# Patient Record
Sex: MALE | Race: WHITE | NOT HISPANIC OR LATINO | Employment: FULL TIME | URBAN - METROPOLITAN AREA
[De-identification: names, ages, dates, MRNs, and addresses within clinical notes are randomized per-mention and may not be internally consistent; named-entity substitution may affect disease eponyms.]

---

## 2018-12-09 ENCOUNTER — OFFICE VISIT (OUTPATIENT)
Dept: URGENT CARE | Facility: PHYSICIAN GROUP | Age: 28
End: 2018-12-09
Payer: COMMERCIAL

## 2018-12-09 VITALS
TEMPERATURE: 97.7 F | WEIGHT: 168.4 LBS | BODY MASS INDEX: 24.86 KG/M2 | OXYGEN SATURATION: 100 % | HEART RATE: 61 BPM | SYSTOLIC BLOOD PRESSURE: 116 MMHG | DIASTOLIC BLOOD PRESSURE: 74 MMHG | RESPIRATION RATE: 16 BRPM

## 2018-12-09 DIAGNOSIS — J01.00 ACUTE NON-RECURRENT MAXILLARY SINUSITIS: ICD-10-CM

## 2018-12-09 PROCEDURE — 99214 OFFICE O/P EST MOD 30 MIN: CPT | Performed by: PHYSICIAN ASSISTANT

## 2018-12-09 RX ORDER — AMOXICILLIN AND CLAVULANATE POTASSIUM 875; 125 MG/1; MG/1
1 TABLET, FILM COATED ORAL 2 TIMES DAILY
Qty: 14 TAB | Refills: 0 | Status: SHIPPED | OUTPATIENT
Start: 2018-12-09 | End: 2018-12-16

## 2018-12-09 RX ORDER — FLUTICASONE PROPIONATE 50 MCG
1 SPRAY, SUSPENSION (ML) NASAL DAILY
Qty: 16 G | Refills: 0 | Status: SHIPPED | OUTPATIENT
Start: 2018-12-09 | End: 2020-03-26

## 2018-12-09 ASSESSMENT — ENCOUNTER SYMPTOMS
SORE THROAT: 1
SINUS PAIN: 1
CARDIOVASCULAR NEGATIVE: 1
SHORTNESS OF BREATH: 0
COUGH: 0
GASTROINTESTINAL NEGATIVE: 1
SINUS PRESSURE: 1
MUSCULOSKELETAL NEGATIVE: 1
CONSTITUTIONAL NEGATIVE: 1
HEADACHES: 1
SWOLLEN GLANDS: 1
WHEEZING: 0

## 2018-12-09 NOTE — PROGRESS NOTES
Subjective:      Jocelynn Alonzo is a 28 y.o. male who presents with Sinus Problem (right sinus pain)            Sinus Problem   This is a new problem. The current episode started in the past 7 days. The problem is unchanged. There has been no fever. The fever has been present for less than 1 day. His pain is at a severity of 5/10. The pain is mild. Associated symptoms include congestion, headaches, sinus pressure, a sore throat and swollen glands. Pertinent negatives include no coughing, ear pain or shortness of breath. Past treatments include oral decongestants. The treatment provided mild relief.       PMH:  has no past medical history on file.  MEDS: No current outpatient prescriptions on file.  ALLERGIES: No Known Allergies  SURGHX: No past surgical history on file.  SOCHX:  reports that he has never smoked. He has never used smokeless tobacco.  FH: family history is not on file.      Review of Systems   Constitutional: Negative.    HENT: Positive for congestion, sinus pain, sinus pressure and sore throat. Negative for ear pain.    Respiratory: Negative for cough, shortness of breath and wheezing.    Cardiovascular: Negative.    Gastrointestinal: Negative.    Musculoskeletal: Negative.    Neurological: Positive for headaches.       Medications, Allergies, and current problem list reviewed today in Epic     Objective:     /74 (BP Location: Right arm, Patient Position: Sitting, BP Cuff Size: Adult)   Pulse 61   Temp 36.5 °C (97.7 °F)   Resp 16   Wt 76.4 kg (168 lb 6.4 oz)   SpO2 100%   BMI 24.86 kg/m²      Physical Exam   Constitutional: He is oriented to person, place, and time. He appears well-developed and well-nourished. No distress.   HENT:   Head: Normocephalic and atraumatic.   Right Ear: Hearing, tympanic membrane and external ear normal.   Left Ear: Hearing, tympanic membrane and external ear normal.   Nose: Right sinus exhibits maxillary sinus tenderness. Left sinus exhibits maxillary  sinus tenderness.   Mouth/Throat: Normal dentition. No dental caries. Posterior oropharyngeal erythema present. No oropharyngeal exudate.   Eyes: Conjunctivae are normal. Right eye exhibits no discharge. Left eye exhibits no discharge.   Neck: Normal range of motion. Neck supple.   Cardiovascular: Normal rate, regular rhythm and normal heart sounds.    Pulmonary/Chest: Effort normal and breath sounds normal. No respiratory distress. He has no wheezes. He has no rales.   Lymphadenopathy:        Head (right side): Submandibular adenopathy present.        Head (left side): Submandibular adenopathy present.     He has no cervical adenopathy.        Right cervical: No superficial cervical adenopathy present.       Left cervical: No superficial cervical adenopathy present.   Neurological: He is alert and oriented to person, place, and time.   Skin: Skin is warm and dry. He is not diaphoretic. No cyanosis. Nails show no clubbing.   Psychiatric: He has a normal mood and affect. His behavior is normal. Judgment and thought content normal.   Nursing note and vitals reviewed.              Assessment/Plan:     1. Acute non-recurrent maxillary sinusitis  amoxicillin-clavulanate (AUGMENTIN) 875-125 MG Tab    fluticasone (FLONASE) 50 MCG/ACT nasal spray     Take full course of antibiotic  Tylenol and Motrin for fever and pain  OTC meds as discussed including oral decongestant if tolerated  Increase fluids and rest  Nasal spray, nasal wash, Andreea pot    Return to clinic or go to ED if symptoms worsen or persist. Indications for ED discussed at length. Patient voices understanding. Follow-up with your primary care provider in 3-5 days. Red flags discussed. All side effects of medication discussed including allergic response, GI upset, tendon injury, etc.    Please note that this dictation was created using voice recognition software. I have made every reasonable attempt to correct obvious errors, but I expect that there are errors  of grammar and possibly content that I did not discover before finalizing the note.

## 2020-03-26 ENCOUNTER — OFFICE VISIT (OUTPATIENT)
Dept: URGENT CARE | Facility: PHYSICIAN GROUP | Age: 30
End: 2020-03-26
Payer: COMMERCIAL

## 2020-03-26 ENCOUNTER — HOSPITAL ENCOUNTER (EMERGENCY)
Facility: MEDICAL CENTER | Age: 30
End: 2020-03-26
Attending: EMERGENCY MEDICINE
Payer: COMMERCIAL

## 2020-03-26 VITALS
DIASTOLIC BLOOD PRESSURE: 52 MMHG | TEMPERATURE: 98.9 F | BODY MASS INDEX: 24.73 KG/M2 | HEART RATE: 44 BPM | SYSTOLIC BLOOD PRESSURE: 110 MMHG | HEIGHT: 69 IN | OXYGEN SATURATION: 100 % | RESPIRATION RATE: 16 BRPM | WEIGHT: 167 LBS

## 2020-03-26 VITALS
OXYGEN SATURATION: 95 % | DIASTOLIC BLOOD PRESSURE: 61 MMHG | SYSTOLIC BLOOD PRESSURE: 121 MMHG | TEMPERATURE: 97.2 F | HEART RATE: 65 BPM | HEIGHT: 71 IN | BODY MASS INDEX: 25.06 KG/M2 | WEIGHT: 179.01 LBS | RESPIRATION RATE: 18 BRPM

## 2020-03-26 DIAGNOSIS — S61.411A LACERATION OF RIGHT HAND WITHOUT FOREIGN BODY, INITIAL ENCOUNTER: ICD-10-CM

## 2020-03-26 DIAGNOSIS — S66.822A EXTENSOR TENDON LACERATION, HAND, OPEN WOUND, LEFT, INITIAL ENCOUNTER: ICD-10-CM

## 2020-03-26 DIAGNOSIS — S61.402A EXTENSOR TENDON LACERATION, HAND, OPEN WOUND, LEFT, INITIAL ENCOUNTER: ICD-10-CM

## 2020-03-26 PROCEDURE — 303747 HCHG EXTRA SUTURE

## 2020-03-26 PROCEDURE — 700101 HCHG RX REV CODE 250: Performed by: EMERGENCY MEDICINE

## 2020-03-26 PROCEDURE — 99284 EMERGENCY DEPT VISIT MOD MDM: CPT

## 2020-03-26 PROCEDURE — 302874 HCHG BANDAGE ACE 2 OR 3""

## 2020-03-26 PROCEDURE — 304999 HCHG REPAIR-SIMPLE/INTERMED LEVEL 1

## 2020-03-26 RX ORDER — LIDOCAINE HYDROCHLORIDE 10 MG/ML
20 INJECTION, SOLUTION INFILTRATION; PERINEURAL ONCE
Status: COMPLETED | OUTPATIENT
Start: 2020-03-26 | End: 2020-03-26

## 2020-03-26 RX ADMIN — LIDOCAINE HYDROCHLORIDE 20 ML: 10 INJECTION, SOLUTION INFILTRATION; PERINEURAL at 17:00

## 2020-03-26 SDOH — HEALTH STABILITY: MENTAL HEALTH: HOW OFTEN DO YOU HAVE A DRINK CONTAINING ALCOHOL?: 2-4 TIMES A MONTH

## 2020-03-26 NOTE — ED PROVIDER NOTES
"ED Provider Note  CHIEF COMPLAINT  Laceration    HPI  Jocelynn Alonzo is a 29 y.o. male who presents with complaint of a laceration on the dorsum of his right hand which occurred just prior to arrival.The patient sustained this injury by cutting it on a vent he was applying in his kitchen. Tetanus vaccination status reviewed and up-to-date.  He was initially seen in urgent care but sent here for further evaluation.  He denies any numbness to his finger.  He denies any difficulty moving his finger.  He is not on anticoagulation.  Denies a history of diabetes.  Patient is right-handed and works as a .    REVIEW OF SYSTEMS  See HPI for further details. All other systems are negative.     PAST MEDICAL HISTORY   Denies    SOCIAL HISTORY  Social History     Tobacco Use   • Smoking status: Never Smoker   • Smokeless tobacco: Never Used   Substance and Sexual Activity   • Alcohol use: Yes     Frequency: 2-4 times a month   • Drug use: Not Currently   • Sexual activity: Not on file       SURGICAL HISTORY   has a past surgical history that includes tonsillectomy and meniscectomy.    CURRENT MEDICATIONS  Reviewed.  See Encounter Summary.      ALLERGIES  No Known Allergies    PHYSICAL EXAM  VITAL SIGNS: /73   Pulse (!) 58   Temp 36.2 °C (97.2 °F) (Temporal)   Resp 16   Ht 1.803 m (5' 11\")   Wt 81.2 kg (179 lb 0.2 oz)   SpO2 99%   BMI 24.97 kg/m²   Constitutional: Alert in no apparent distress.  HENT: Normocephalic, Atraumatic, Bilateral external ears normal. Nose normal.   Eyes: Pupils are equal and reactive. Conjunctiva normal, non-icteric.   Thorax & Lungs: Easy unlabored respirations  Abdomen:  No gross signs of peritonitis no pain with movement   Skin: On the dorsum of the right hand just proximal to the fifth MCP joint there is a 3 cm laceration.  There is minimal bleeding.  There is evidence of an extensor tendon laceration.  No evidence of foreign body.  Extremities: Good cap refill of the " fifth digit, intact sensation to light touch, normal extension of the finger.  Neurologic: Alert, Grossly non-focal.   Psychiatric: Affect and Mood normal      COURSE & MEDICAL DECISION MAKING  Pertinent Labs & Imaging studies reviewed. (See chart for details)    Patient presents with a extensor tendon laceration of the fifth digit.  Patient is able to extend his fifth digit but visualization shows a laceration.  Discussed the case with Dr. Davalos who recommends the patient follow-up with the hand team at [facility].  The wound was cleaned and loosely approximated.  He will be placed in a hand splint to decrease movement as I suspect this is a partial laceration.  Patient understands his follow-up plan.    Laceration Repair Procedure Note    Indication: Laceration    Procedure: The patient was placed in the appropriate position and anesthesia around the laceration was obtained by infiltration using 1% Lidocaine without epinephrine. The area was then cleansed with betadine and normal saline and draped in a sterile fashion.  The laceration was viewed in a bloodless field with full range of motion and no tendon laceration or foreign bodies were visualized. The laceration was closed with 4-0 Ethilon using interrupted sutures. There were no additional lacerations requiring repair. The wound area was then dressed with a bandage.      Total repaired wound length: 3 cm.     Other Items: Suture count: 3    The patient tolerated the procedure well.    Complications: None      Patient understands to call the specialist tomorrow.  Do not feel the patient needs antibiotics at this time as the fluid was clean having never been used before.  The patient needs to return immediately if there is any redness pus or drainage or signs of infection otherwise they should follow the wound care information sheet     FINAL IMPRESSION  1. Laceration of right hand without foreign body, initial encounter     2. Extensor tendon laceration,  hand, open wound, left, initial encounter          The patient was discharged home with an information sheet on laceration care and told to return immediately for any signs or symptoms listed, but specifically if any redness, drainage, pus or wound opening.  The follow-up plan is documented in EPIC and provided in writing to the patient.    Electronically signed by: Debra Travis M.D., 3/26/2020 4:53 PM

## 2020-03-26 NOTE — ED TRIAGE NOTES
Jocelynn Osmar [facility]  Chief Complaint   Patient presents with   • Hand Laceration     right   • Sent from Urgent Care     Pt ambulatory to triage with above complaint. VSS, no acute distress.   Pt states cut hand on piece of sheet metal,  CMS intact,  Bleeding controlled with pressure bandage.  UC sent pt over due to depth of laceration. Tetanus up to date  Pt returned to lobby, educated on triage process, and to inform staff of any changes or concerns.

## 2020-03-26 NOTE — DISCHARGE INSTRUCTIONS
You have evidence of a laceration to your extensor tendon.  You need to call tomorrow to get an appointment with the hand team at the [place] [facility].  Your laceration was only loosely closed as they will have to go back in to repair the tendon.  Wear the splint until seen by the specialist.  Any worsening pain, fevers, new or different symptoms or concerns return.

## 2020-10-05 ENCOUNTER — OFFICE VISIT (OUTPATIENT)
Dept: URGENT CARE | Facility: PHYSICIAN GROUP | Age: 30
End: 2020-10-05
Payer: COMMERCIAL

## 2020-10-05 VITALS
BODY MASS INDEX: 24.88 KG/M2 | DIASTOLIC BLOOD PRESSURE: 70 MMHG | OXYGEN SATURATION: 99 % | SYSTOLIC BLOOD PRESSURE: 106 MMHG | HEIGHT: 69 IN | TEMPERATURE: 98.7 F | HEART RATE: 68 BPM | WEIGHT: 168 LBS | RESPIRATION RATE: 16 BRPM

## 2020-10-05 DIAGNOSIS — J01.01 ACUTE RECURRENT MAXILLARY SINUSITIS: ICD-10-CM

## 2020-10-05 PROCEDURE — 99214 OFFICE O/P EST MOD 30 MIN: CPT | Performed by: STUDENT IN AN ORGANIZED HEALTH CARE EDUCATION/TRAINING PROGRAM

## 2020-10-05 RX ORDER — AMOXICILLIN AND CLAVULANATE POTASSIUM 875; 125 MG/1; MG/1
1 TABLET, FILM COATED ORAL 2 TIMES DAILY
Qty: 20 TAB | Refills: 0 | Status: SHIPPED | OUTPATIENT
Start: 2020-10-05 | End: 2023-01-10

## 2020-10-05 NOTE — PROGRESS NOTES
"Subjective:   Cc:        HPI:  Jocelynn Alonzo is a 30 y.o. male who presents with a chief complaint of left maxillary sinus pain for approximately 1 week.  The patient reports his symptoms have gotten progressively worse over last 24 hours.  He was seen at urgent care approximately 2 years ago with similar symptoms and treated with Augmentin.  The patient has tried multiple over-the-counter medications which have not provided any relief of symptoms.  Symptoms are aggravated with eating.  There are no alleviating factors.  He admits associated symptoms of productive cough secondary to postnasal drip.  He denies any associated symptoms of fevers, chills, shortness of breath or wheezing.    PMH:  has no past medical history on file.  SURGHX:   Past Surgical History:   Procedure Laterality Date   • MENISCECTOMY     • TONSILLECTOMY       ALLERGIES: No Known Allergies  MEDS: No current outpatient medications on file.  SOCHX:  reports that he has never smoked. He has never used smokeless tobacco. He reports current alcohol use. He reports previous drug use.  FH: History reviewed. No pertinent family history.    ROS:  General: no fever or chills  Skin: no rash or new lesions  ENT:  no congestion, ear pain or sore throat   EYES: no discharge redness or vision changes  CV: no chest pain, palpitations, syncope   Pulm: no cough,congestions, SOB or wheezing   GI: no vomiting, diarrhea, constipation or abdominal pain   : no dysuria, hematuria or flank pain  MSK: no swelling, deformity or joint pain  Neuro: no weakness, numbness, aphasia or headache   Psych: no anxiety or depression     Objective:   /70   Pulse 68   Temp 37.1 °C (98.7 °F)   Resp 16   Ht 1.753 m (5' 9\")   Wt 76.2 kg (168 lb)   SpO2 99%   BMI 24.81 kg/m²   Vitals reviewed by Nathan Wetzel D.O.. 10/5/2020  9:15 AM    Gen: no acute distress, normal voice  Skin: dry, intact, moist mucosal membranes  ENT: Tympanic membranes clear and intact " bilaterally without presence of middle ear effusion.  No pharyngeal erythema w/o exudates.  Presence of clear nasal discharge.  No tonsillar enlargement bilaterally or cervical lymphadenopathy.  Mild tenderness to palpation over the left maxillary sinus  Lungs: CTAB w/ symmetric expansion  CV: RRR w/o murmurs or clicks    Assessment/Plan:   1. Acute recurrent maxillary sinusitis  Si/sx c/w with bacterial sinus infection.  -Rx sent for Augmentin  -Instructed to perform nasal flushes  -Encouraged hydration  -RTC or follow-up with PCP in one week or sooner if sx worsen, do not improve or any further questions or concerns. The patient understood everything discussed and all questions were answered.     Discussed close monitoring, return precautions, and supportive measures of maintaining adequate fluid hydration and caloric intake, relative rest and symptom management.     Differential diagnosis, natural history, supportive care, and indications for immediate follow-up discussed at length.     Advised the patient to follow-up with the primary care physician for recheck, reevaluation, and consideration of further management.

## 2021-11-12 ENCOUNTER — NON-PROVIDER VISIT (OUTPATIENT)
Dept: SPORTS MEDICINE | Facility: OTHER | Age: 31
End: 2021-11-12

## 2021-11-12 DIAGNOSIS — Z11.1 PPD SCREENING TEST: ICD-10-CM

## 2021-12-03 PROCEDURE — 86580 TB INTRADERMAL TEST: CPT | Performed by: FAMILY MEDICINE

## 2021-12-15 LAB — TB WHEAL 3D P 5 TU DIAM: 0 MM

## 2022-10-17 ENCOUNTER — NON-PROVIDER VISIT (OUTPATIENT)
Dept: URGENT CARE | Facility: PHYSICIAN GROUP | Age: 32
End: 2022-10-17

## 2022-10-17 DIAGNOSIS — Z11.1 ENCOUNTER FOR PPD TEST: Primary | ICD-10-CM

## 2022-10-20 LAB — TB WHEAL 3D P 5 TU DIAM: 0 MM

## 2022-10-21 PROCEDURE — 86580 TB INTRADERMAL TEST: CPT | Performed by: FAMILY MEDICINE

## 2022-10-21 NOTE — NON-PROVIDER
Jocelynn Alonzo is a 32 y.o. male here for a non-provider visit for PPD placement -- Step 1 of 1    Reason for PPD:  work requirement    1. TB evaluation questionnaire completed by patient? Yes      -  If any answers marked yes did you contact a provider prior to placing? No  2.  Patient notified to return to clinic for reading on: 10/19  3.  PPD Placement documentation completed on TB evaluation questionnaire? Yes  4.  Location of TB evaluation questionnaire filed: PPD file

## 2022-10-24 ENCOUNTER — HOSPITAL ENCOUNTER (OUTPATIENT)
Dept: RADIOLOGY | Facility: MEDICAL CENTER | Age: 32
End: 2022-10-24
Attending: FAMILY MEDICINE
Payer: COMMERCIAL

## 2022-10-24 DIAGNOSIS — R10.9 ABDOMINAL PAIN, UNSPECIFIED ABDOMINAL LOCATION: ICD-10-CM

## 2022-10-24 DIAGNOSIS — R10.31 RLQ ABDOMINAL PAIN: ICD-10-CM

## 2022-10-24 PROCEDURE — 76700 US EXAM ABDOM COMPLETE: CPT

## 2023-01-10 ENCOUNTER — OFFICE VISIT (OUTPATIENT)
Dept: URGENT CARE | Facility: PHYSICIAN GROUP | Age: 33
End: 2023-01-10
Payer: COMMERCIAL

## 2023-01-10 VITALS
DIASTOLIC BLOOD PRESSURE: 60 MMHG | SYSTOLIC BLOOD PRESSURE: 104 MMHG | WEIGHT: 170 LBS | TEMPERATURE: 98.2 F | RESPIRATION RATE: 16 BRPM | OXYGEN SATURATION: 96 % | HEART RATE: 84 BPM | HEIGHT: 69 IN | BODY MASS INDEX: 25.18 KG/M2

## 2023-01-10 DIAGNOSIS — B96.89 ACUTE BACTERIAL SINUSITIS: ICD-10-CM

## 2023-01-10 DIAGNOSIS — J01.90 ACUTE BACTERIAL SINUSITIS: ICD-10-CM

## 2023-01-10 PROCEDURE — 99213 OFFICE O/P EST LOW 20 MIN: CPT | Performed by: FAMILY MEDICINE

## 2023-01-10 RX ORDER — DOXYCYCLINE 100 MG/1
100 CAPSULE ORAL 2 TIMES DAILY
Qty: 14 CAPSULE | Refills: 0 | Status: SHIPPED | OUTPATIENT
Start: 2023-01-10

## 2023-01-10 ASSESSMENT — ENCOUNTER SYMPTOMS
SINUS PAIN: 1
COUGH: 1

## 2023-01-10 NOTE — PROGRESS NOTES
"Subjective     Jocelynn Alonzo is a 32 y.o. male who presents with Sinusitis (X a couple of days with congestion, runny nose, sinus pressure and cough)      - This is a pleasant and nontoxic appearing 32 y.o. male who has come to the walk-in clinic today for:    #1) several days sinus pain/pressure on Rt w/ colorful dc. Hx sinus infections feels same. No associated NVFC. Slight/mild cough as well.       ALLERGIES:  Patient has no known allergies.     PMH:  History reviewed. No pertinent past medical history.     PSH:  Past Surgical History:   Procedure Laterality Date    MENISCECTOMY      TONSILLECTOMY         MEDS:    Current Outpatient Medications:     doxycycline (MONODOX) 100 MG capsule, Take 1 Capsule by mouth 2 times a day., Disp: 14 Capsule, Rfl: 0    ** I have documented what I find to be significant in regards to past medical, social, family and surgical history  in my HPI or under PMH/PSH/FH review section, otherwise it is noncontributory **         HPI    Review of Systems   HENT:  Positive for congestion and sinus pain.    Respiratory:  Positive for cough.    All other systems reviewed and are negative.           Objective     /60 (BP Location: Left arm, Patient Position: Sitting, BP Cuff Size: Adult)   Pulse 84   Temp 36.8 °C (98.2 °F) (Temporal)   Resp 16   Ht 1.753 m (5' 9\")   Wt 77.1 kg (170 lb)   SpO2 96%   BMI 25.10 kg/m²      Physical Exam  Vitals and nursing note reviewed.   Constitutional:       General: He is not in acute distress.     Appearance: Normal appearance. He is well-developed.   HENT:      Head: Normocephalic.      Nose: Congestion and rhinorrhea present.      Mouth/Throat:      Mouth: Mucous membranes are moist.      Pharynx: Oropharynx is clear.   Cardiovascular:      Heart sounds: Normal heart sounds. No murmur heard.  Pulmonary:      Effort: Pulmonary effort is normal. No respiratory distress.      Breath sounds: Normal breath sounds.   Neurological:      Mental " Status: He is alert.      Motor: No abnormal muscle tone.   Psychiatric:         Mood and Affect: Mood normal.         Behavior: Behavior normal.                           Assessment & Plan       1. Acute bacterial sinusitis  doxycycline (MONODOX) 100 MG capsule          - Dx, plan & d/c instructions discussed   - Rest, stay hydrated  - OTC Flonase x 4 weeks, Sudafed x 5-7 days as needed      Follow up with your regular primary care providers office for a recheck on today's visit. ER if not improving in 2-3 days or if feeling/getting worse.     Patient left in stable condition     Pertinent prior office visit notes in Epic have been reviewed by me today on day of this visit.

## 2024-05-29 ENCOUNTER — RESEARCH ENCOUNTER (OUTPATIENT)
Dept: RESEARCH | Facility: MEDICAL CENTER | Age: 34
End: 2024-05-29